# Patient Record
Sex: MALE | Race: WHITE | NOT HISPANIC OR LATINO | URBAN - METROPOLITAN AREA
[De-identification: names, ages, dates, MRNs, and addresses within clinical notes are randomized per-mention and may not be internally consistent; named-entity substitution may affect disease eponyms.]

---

## 2022-06-23 ENCOUNTER — LAB VISIT (OUTPATIENT)
Dept: LAB | Facility: OTHER | Age: 42
End: 2022-06-23
Attending: OTOLARYNGOLOGY
Payer: COMMERCIAL

## 2022-06-23 ENCOUNTER — OFFICE VISIT (OUTPATIENT)
Dept: OTOLARYNGOLOGY | Facility: CLINIC | Age: 42
End: 2022-06-23
Payer: COMMERCIAL

## 2022-06-23 VITALS — TEMPERATURE: 97 F | HEART RATE: 87 BPM | SYSTOLIC BLOOD PRESSURE: 147 MMHG | DIASTOLIC BLOOD PRESSURE: 88 MMHG

## 2022-06-23 DIAGNOSIS — K12.1 ORAL ULCERATION: ICD-10-CM

## 2022-06-23 DIAGNOSIS — K12.1 ORAL ULCERATION: Primary | ICD-10-CM

## 2022-06-23 LAB
ALBUMIN SERPL BCP-MCNC: 4 G/DL (ref 3.5–5.2)
ALP SERPL-CCNC: 93 U/L (ref 55–135)
ALT SERPL W/O P-5'-P-CCNC: 19 U/L (ref 10–44)
ANION GAP SERPL CALC-SCNC: 13 MMOL/L (ref 8–16)
AST SERPL-CCNC: 17 U/L (ref 10–40)
BASOPHILS # BLD AUTO: 0.02 K/UL (ref 0–0.2)
BASOPHILS NFR BLD: 0.3 % (ref 0–1.9)
BILIRUB SERPL-MCNC: 0.5 MG/DL (ref 0.1–1)
BUN SERPL-MCNC: 7 MG/DL (ref 6–20)
CALCIUM SERPL-MCNC: 9.8 MG/DL (ref 8.7–10.5)
CHLORIDE SERPL-SCNC: 108 MMOL/L (ref 95–110)
CO2 SERPL-SCNC: 18 MMOL/L (ref 23–29)
CREAT SERPL-MCNC: 1.1 MG/DL (ref 0.5–1.4)
CRP SERPL-MCNC: 5.9 MG/L (ref 0–8.2)
DIFFERENTIAL METHOD: ABNORMAL
EOSINOPHIL # BLD AUTO: 0.1 K/UL (ref 0–0.5)
EOSINOPHIL NFR BLD: 1.6 % (ref 0–8)
ERYTHROCYTE [DISTWIDTH] IN BLOOD BY AUTOMATED COUNT: 12.6 % (ref 11.5–14.5)
ERYTHROCYTE [SEDIMENTATION RATE] IN BLOOD: 42 MM/HR (ref 0–10)
EST. GFR  (AFRICAN AMERICAN): >60 ML/MIN/1.73 M^2
EST. GFR  (NON AFRICAN AMERICAN): >60 ML/MIN/1.73 M^2
GLUCOSE SERPL-MCNC: 111 MG/DL (ref 70–110)
HCT VFR BLD AUTO: 40 % (ref 40–54)
HGB BLD-MCNC: 13.2 G/DL (ref 14–18)
IMM GRANULOCYTES # BLD AUTO: 0.02 K/UL (ref 0–0.04)
IMM GRANULOCYTES NFR BLD AUTO: 0.3 % (ref 0–0.5)
LYMPHOCYTES # BLD AUTO: 1.2 K/UL (ref 1–4.8)
LYMPHOCYTES NFR BLD: 19.6 % (ref 18–48)
MCH RBC QN AUTO: 30.5 PG (ref 27–31)
MCHC RBC AUTO-ENTMCNC: 33 G/DL (ref 32–36)
MCV RBC AUTO: 92 FL (ref 82–98)
MONOCYTES # BLD AUTO: 0.4 K/UL (ref 0.3–1)
MONOCYTES NFR BLD: 6.6 % (ref 4–15)
NEUTROPHILS # BLD AUTO: 4.4 K/UL (ref 1.8–7.7)
NEUTROPHILS NFR BLD: 71.6 % (ref 38–73)
NRBC BLD-RTO: 0 /100 WBC
PLATELET # BLD AUTO: 308 K/UL (ref 150–450)
PMV BLD AUTO: 9.1 FL (ref 9.2–12.9)
POTASSIUM SERPL-SCNC: 3.7 MMOL/L (ref 3.5–5.1)
PROT SERPL-MCNC: 7.7 G/DL (ref 6–8.4)
RBC # BLD AUTO: 4.33 M/UL (ref 4.6–6.2)
SODIUM SERPL-SCNC: 139 MMOL/L (ref 136–145)
WBC # BLD AUTO: 6.17 K/UL (ref 3.9–12.7)

## 2022-06-23 PROCEDURE — 31575 DIAGNOSTIC LARYNGOSCOPY: CPT | Mod: S$GLB,,, | Performed by: OTOLARYNGOLOGY

## 2022-06-23 PROCEDURE — 85651 RBC SED RATE NONAUTOMATED: CPT | Performed by: OTOLARYNGOLOGY

## 2022-06-23 PROCEDURE — 3077F PR MOST RECENT SYSTOLIC BLOOD PRESSURE >= 140 MM HG: ICD-10-PCS | Mod: CPTII,S$GLB,, | Performed by: OTOLARYNGOLOGY

## 2022-06-23 PROCEDURE — 87102 FUNGUS ISOLATION CULTURE: CPT | Performed by: OTOLARYNGOLOGY

## 2022-06-23 PROCEDURE — 3079F PR MOST RECENT DIASTOLIC BLOOD PRESSURE 80-89 MM HG: ICD-10-PCS | Mod: CPTII,S$GLB,, | Performed by: OTOLARYNGOLOGY

## 2022-06-23 PROCEDURE — 86038 ANTINUCLEAR ANTIBODIES: CPT | Performed by: OTOLARYNGOLOGY

## 2022-06-23 PROCEDURE — 1159F MED LIST DOCD IN RCRD: CPT | Mod: CPTII,S$GLB,, | Performed by: OTOLARYNGOLOGY

## 2022-06-23 PROCEDURE — 86140 C-REACTIVE PROTEIN: CPT | Performed by: OTOLARYNGOLOGY

## 2022-06-23 PROCEDURE — 1160F PR REVIEW ALL MEDS BY PRESCRIBER/CLIN PHARMACIST DOCUMENTED: ICD-10-PCS | Mod: CPTII,S$GLB,, | Performed by: OTOLARYNGOLOGY

## 2022-06-23 PROCEDURE — 99204 PR OFFICE/OUTPT VISIT, NEW, LEVL IV, 45-59 MIN: ICD-10-PCS | Mod: 25,S$GLB,, | Performed by: OTOLARYNGOLOGY

## 2022-06-23 PROCEDURE — 36415 COLL VENOUS BLD VENIPUNCTURE: CPT | Performed by: OTOLARYNGOLOGY

## 2022-06-23 PROCEDURE — 80053 COMPREHEN METABOLIC PANEL: CPT | Performed by: OTOLARYNGOLOGY

## 2022-06-23 PROCEDURE — 3077F SYST BP >= 140 MM HG: CPT | Mod: CPTII,S$GLB,, | Performed by: OTOLARYNGOLOGY

## 2022-06-23 PROCEDURE — 3079F DIAST BP 80-89 MM HG: CPT | Mod: CPTII,S$GLB,, | Performed by: OTOLARYNGOLOGY

## 2022-06-23 PROCEDURE — 87075 CULTR BACTERIA EXCEPT BLOOD: CPT | Performed by: OTOLARYNGOLOGY

## 2022-06-23 PROCEDURE — 87070 CULTURE OTHR SPECIMN AEROBIC: CPT | Performed by: OTOLARYNGOLOGY

## 2022-06-23 PROCEDURE — 85025 COMPLETE CBC W/AUTO DIFF WBC: CPT | Performed by: OTOLARYNGOLOGY

## 2022-06-23 PROCEDURE — 1159F PR MEDICATION LIST DOCUMENTED IN MEDICAL RECORD: ICD-10-PCS | Mod: CPTII,S$GLB,, | Performed by: OTOLARYNGOLOGY

## 2022-06-23 PROCEDURE — 31575 PR LARYNGOSCOPY, FLEXIBLE; DIAGNOSTIC: ICD-10-PCS | Mod: S$GLB,,, | Performed by: OTOLARYNGOLOGY

## 2022-06-23 PROCEDURE — 99204 OFFICE O/P NEW MOD 45 MIN: CPT | Mod: 25,S$GLB,, | Performed by: OTOLARYNGOLOGY

## 2022-06-23 PROCEDURE — 87076 CULTURE ANAEROBE IDENT EACH: CPT | Performed by: OTOLARYNGOLOGY

## 2022-06-23 PROCEDURE — 87389 HIV-1 AG W/HIV-1&-2 AB AG IA: CPT | Performed by: OTOLARYNGOLOGY

## 2022-06-23 PROCEDURE — 1160F RVW MEDS BY RX/DR IN RCRD: CPT | Mod: CPTII,S$GLB,, | Performed by: OTOLARYNGOLOGY

## 2022-06-23 RX ORDER — VALACYCLOVIR HYDROCHLORIDE 1 G/1
1000 TABLET, FILM COATED ORAL 2 TIMES DAILY
Qty: 42 TABLET | Refills: 0 | Status: SHIPPED | OUTPATIENT
Start: 2022-06-23 | End: 2022-07-14

## 2022-06-23 RX ORDER — AZELASTINE HCL 205.5 UG/1
SPRAY NASAL
COMMUNITY
Start: 2022-05-27

## 2022-06-23 RX ORDER — TADALAFIL 5 MG/1
TABLET ORAL
COMMUNITY
Start: 2022-03-01

## 2022-06-23 RX ORDER — FLUTICASONE PROPIONATE AND SALMETEROL 100; 50 UG/1; UG/1
POWDER RESPIRATORY (INHALATION)
COMMUNITY
Start: 2022-05-27

## 2022-06-23 NOTE — PROGRESS NOTES
Subjective:     Chief Complaint:   Chief Complaint   Patient presents with    Other     Screening for throat cancer       Brandon Downing is a 42 y.o. male who was self-referred for oral ulcers.    Reports 4 months of recurrent mouth sores. Cultures performed by his primary and were negative. Treated with allergy medications without improvement.  Patient denies any reflux or heartburn symptoms.  Patient reports lifelong history of recurrent aphthous ulcers.  Reports in his adolescents these were frequent and bothersome.  Over the past few years his symptoms have not been as severe until the last 4 months.  Reports oral in or pharyngeal ulcers involving the mucosal lips ventral surface of the tongue and tonsillar region.  Worse on the left side.  He denies any inflammatory bowel symptoms.  No history of recurrent infections.  Not immunocompromised.     Patient has a history of facial trauma resulting in injury to his maxillary teeth.  Had upper plate placed in 2016.     No fam history of ulcerative collitis.   Maternal grandfather with h/o oropharyngeal SCC.     There is not a prior history of sinonasal surgery.  He is not an active smoker.  Reports rare vaping approximately 2 times per month but none recently.  No significant alcohol use.  Rare marijuana use but none recently.    Past Medical History  He has no past medical history on file. Laminectomy, appendectomy.     Past Surgical History  He has no past surgical history on file.    Family History  His family history is not on file.    Social History  He reports that he has never smoked. He has never used smokeless tobacco. reports vaping 1-2 x per months; ETOH minimal, rare marijuana use    Allergies  He has No Known Allergies.    Medications  He has a current medication list which includes the following prescription(s): azelastine, cetirizine, fluticasone-salmeterol 100-50 mcg/dose, tadalafil, diphenhydrAMINE-aluminum-magnesium  hydroxide-simethicone-LIDOcaine HCl 2%, and valacyclovir.    Review of Systems     Constitutional: Positive for fatigue and unexpected weight loss.      HENT: Positive for ear pain, hearing loss, mouth sores and sore throat.      Respiratory:  Positive for cough, sleep apnea and snoring.      Cardiovascular:  Negative for chest pain, foot swelling, irregular heartbeat and swollen veins.     Gastrointestinal:  Positive for abdominal pain and heartburn.     Musc: Positive for back pain.     Skin: Negative for rash.     Allergy: Negative for food allergies and seasonal allergies.     Endocrine: Negative for cold intolerance and heat intolerance.      Neurological: Positive for headaches and light-headedness.     Hematologic: Positive for swollen glands.     Psychiatric: Positive for decreased concentration, depression, nervous/anxious and sleep disturbance.              Objective:     BP (!) 147/88   Pulse 87   Temp 97 °F (36.1 °C) (Temporal)      Constitutional:   Vital signs are normal. He appears well-developed and well-nourished. Normal speech.      Head:  Normocephalic and atraumatic.     Ears:  Right ear hearing normal to normal and whispered voice; external ear normal without scars, lesions, or masses; ear canal, tympanic membrane, and middle ear normal. and left ear hearing normal to normal and whispered voice; external ear normal without scars, lesions, or masses; ear canal, tympanic membrane, and middle ear normal..     Nose:  No mucosal edema or rhinorrhea. Turbinates normal.  Right sinus exhibits no maxillary sinus tenderness and no frontal sinus tenderness. Left sinus exhibits no maxillary sinus tenderness and no frontal sinus tenderness.     Mouth/Throat          Neck:  Neck normal without thyromegaly masses, asymmetry, normal tracheal structure, crepitus, and tenderness, thyroid normal, trachea normal, phonation normal and no adenopathy.     Pulmonary/Chest:   Effort normal. No apnea, no tachypnea and  no bradypnea. No respiratory distress.     Psychiatric:   His speech is normal and behavior is normal. His mood appears anxious.       Procedure    Flexible laryngoscopy performed.  See procedure note.    Nasal/Nasopharyngo/Laryn/Hypopharyngoscopy Procedures    Procedure:  Diagnostic nasal, nasopharyngoscopy, laryngoscopy and hypopharyngoscopy.    Routine preparation with local 1% neosynephrine and lidocaine     NOSE:   External:  No gross deformity   Intranasal:    Mucosa:  No polyps, ulcers or lesions.    Septum:  No gross deformity.    Turbinates:  Not enlarged.    Nasopharynx:  No lesions.   Mucosa:  No lesions.   Adenoids:  Present.   Posterior Choanae:  Patent.   Eustachian Tubes:  Patent.    Oropharynx:    BOT: No lesions or edema      Larynx/hypopharynx:   Epiglottis:  No lesions, without edema.   Arytenoids: no lesions    AE Folds:  No lesions.   Vocal cords:  No masses, symmetric movement, good approximation    Subglottis: No obvious stenosis   Hypopharynx:  No lesions.   Piriform sinus:  No pooling or lesions.   Post Cricoid:  No edema or erythema             Assessment:     1. Oral ulceration          Plan:     I had a long discussion with the patient regarding his condition and the further workup and management options.  Aerobic, anaerobic, and fungal cultures obtained from then oropharyngeal ulcer on the left tonsil.  We will empirically treat with Valtrex and treat symptoms with Magic mouthwash.  Will check CBC, CMP ESR, CRP, HIV and MARKY.  He will follow-up in 1 week and will plan for in office biopsy if ulcerations are still present or have not improved.  I will contact him if culture results are positive in require antibiotic treatment.

## 2022-06-24 LAB
ANA SER QL IF: NORMAL
HIV 1+2 AB+HIV1 P24 AG SERPL QL IA: NEGATIVE

## 2022-06-25 LAB — BACTERIA SPEC AEROBE CULT: NORMAL

## 2022-06-27 LAB — BACTERIA SPEC ANAEROBE CULT: ABNORMAL

## 2022-06-30 ENCOUNTER — OFFICE VISIT (OUTPATIENT)
Dept: OTOLARYNGOLOGY | Facility: CLINIC | Age: 42
End: 2022-06-30
Payer: COMMERCIAL

## 2022-06-30 VITALS
TEMPERATURE: 98 F | HEART RATE: 92 BPM | SYSTOLIC BLOOD PRESSURE: 153 MMHG | DIASTOLIC BLOOD PRESSURE: 102 MMHG | HEIGHT: 74 IN

## 2022-06-30 DIAGNOSIS — K12.1 ORAL ULCERATION: Primary | ICD-10-CM

## 2022-06-30 PROCEDURE — 3077F PR MOST RECENT SYSTOLIC BLOOD PRESSURE >= 140 MM HG: ICD-10-PCS | Mod: CPTII,S$GLB,, | Performed by: OTOLARYNGOLOGY

## 2022-06-30 PROCEDURE — 40490 BIOPSY OF LIP: CPT | Mod: S$GLB,,, | Performed by: OTOLARYNGOLOGY

## 2022-06-30 PROCEDURE — 3080F PR MOST RECENT DIASTOLIC BLOOD PRESSURE >= 90 MM HG: ICD-10-PCS | Mod: CPTII,S$GLB,, | Performed by: OTOLARYNGOLOGY

## 2022-06-30 PROCEDURE — 3080F DIAST BP >= 90 MM HG: CPT | Mod: CPTII,S$GLB,, | Performed by: OTOLARYNGOLOGY

## 2022-06-30 PROCEDURE — 99213 OFFICE O/P EST LOW 20 MIN: CPT | Mod: 25,S$GLB,, | Performed by: OTOLARYNGOLOGY

## 2022-06-30 PROCEDURE — 40490 PR BIOPSY OF LIP: ICD-10-PCS | Mod: S$GLB,,, | Performed by: OTOLARYNGOLOGY

## 2022-06-30 PROCEDURE — 88305 TISSUE EXAM BY PATHOLOGIST: CPT | Performed by: PATHOLOGY

## 2022-06-30 PROCEDURE — 88305 TISSUE EXAM BY PATHOLOGIST: CPT | Mod: 26,,, | Performed by: PATHOLOGY

## 2022-06-30 PROCEDURE — 1159F PR MEDICATION LIST DOCUMENTED IN MEDICAL RECORD: ICD-10-PCS | Mod: CPTII,S$GLB,, | Performed by: OTOLARYNGOLOGY

## 2022-06-30 PROCEDURE — 3077F SYST BP >= 140 MM HG: CPT | Mod: CPTII,S$GLB,, | Performed by: OTOLARYNGOLOGY

## 2022-06-30 PROCEDURE — 1160F RVW MEDS BY RX/DR IN RCRD: CPT | Mod: CPTII,S$GLB,, | Performed by: OTOLARYNGOLOGY

## 2022-06-30 PROCEDURE — 1159F MED LIST DOCD IN RCRD: CPT | Mod: CPTII,S$GLB,, | Performed by: OTOLARYNGOLOGY

## 2022-06-30 PROCEDURE — 1160F PR REVIEW ALL MEDS BY PRESCRIBER/CLIN PHARMACIST DOCUMENTED: ICD-10-PCS | Mod: CPTII,S$GLB,, | Performed by: OTOLARYNGOLOGY

## 2022-06-30 PROCEDURE — 88305 TISSUE EXAM BY PATHOLOGIST: ICD-10-PCS | Mod: 26,,, | Performed by: PATHOLOGY

## 2022-06-30 PROCEDURE — 99213 PR OFFICE/OUTPT VISIT, EST, LEVL III, 20-29 MIN: ICD-10-PCS | Mod: 25,S$GLB,, | Performed by: OTOLARYNGOLOGY

## 2022-06-30 NOTE — PROGRESS NOTES
Subjective:     Chief Complaint:   Chief Complaint   Patient presents with    Follow-up     Oral ulcers getting better       Brandon Downing is a 42 y.o. male who was self-referred for oral ulcers.    Reports 4 months of recurrent mouth sores. Cultures performed by his primary and were negative. Treated with allergy medications without improvement.  Patient denies any reflux or heartburn symptoms.  Patient reports lifelong history of recurrent aphthous ulcers.  Reports in his adolescents these were frequent and bothersome.  Over the past few years his symptoms have not been as severe until the last 4 months.  Reports oral in or pharyngeal ulcers involving the mucosal lips ventral surface of the tongue and tonsillar region.  Worse on the left side.  He denies any inflammatory bowel symptoms.  No history of recurrent infections.  Not immunocompromised.     Patient has a history of facial trauma resulting in injury to his maxillary teeth.  Had upper plate placed in 2016.     No fam history of ulcerative collitis.   Maternal grandfather with h/o oropharyngeal SCC.     There is not a prior history of sinonasal surgery.  He is not an active smoker.  Reports rare vaping approximately 2 times per month but none recently.  No significant alcohol use.  Rare marijuana use but none recently.    Today (06/30/2022): Patient returns for follow up visit. Patient reports waxing and waning of ulcerations.  Feels the mouthwash is improving symptoms.  Stop taking the Valtrex as he did not notice a difference on this.  He has noticed joint aches and fatigue more recently.    Past Medical History  He has no past medical history on file. Laminectomy, appendectomy.     Past Surgical History  He has no past surgical history on file.    Family History  His family history is not on file.    Social History  He reports that he has never smoked. He has never used smokeless tobacco. reports vaping 1-2 x per months; ETOH minimal, rare  "marijuana use    Allergies  He has No Known Allergies.    Medications  He has a current medication list which includes the following prescription(s): azelastine, cetirizine, diphenhydrAMINE-aluminum-magnesium hydroxide-simethicone-LIDOcaine HCl 2%, fluticasone-salmeterol 100-50 mcg/dose, tadalafil, and valacyclovir.    Review of Systems     Constitutional: Positive for fatigue and unexpected weight loss.      HENT: Positive for ear pain, hearing loss, mouth sores, sore throat and trouble swallowing.      Eyes:  Positive for eye drainage, eye itching and photophobia.     Respiratory:  Positive for cough, sleep apnea and snoring.      Cardiovascular:  Negative for chest pain, foot swelling, irregular heartbeat and swollen veins.     Gastrointestinal:  Positive for abdominal pain, constipation and heartburn.     Genitourinary: Negative for difficulty urinating, sexual problems and frequent urination.     Musc: Positive for aching muscles, back pain and neck pain.     Skin: Negative for rash.     Allergy: Negative for food allergies and seasonal allergies.     Endocrine: Negative for cold intolerance and heat intolerance.      Neurological: Positive for headaches and light-headedness.     Hematologic: Positive for swollen glands.     Psychiatric: Positive for decreased concentration, depression, nervous/anxious and sleep disturbance.              Objective:     BP (!) 153/102   Pulse 92   Temp 97.9 °F (36.6 °C)   Ht 6' 2" (1.88 m)      Constitutional:   Vital signs are normal. He appears well-developed and well-nourished. Normal speech.      Head:  Normocephalic and atraumatic.     Ears:  Right ear hearing normal to normal and whispered voice; external ear normal without scars, lesions, or masses; ear canal, tympanic membrane, and middle ear normal. and left ear hearing normal to normal and whispered voice; external ear normal without scars, lesions, or masses; ear canal, tympanic membrane, and middle ear normal.. "     Nose:  No mucosal edema or rhinorrhea. Turbinates normal.  Right sinus exhibits no maxillary sinus tenderness and no frontal sinus tenderness. Left sinus exhibits no maxillary sinus tenderness and no frontal sinus tenderness.     Mouth/Throat          Neck:  Neck normal without thyromegaly masses, asymmetry, normal tracheal structure, crepitus, and tenderness, thyroid normal, trachea normal, phonation normal and no adenopathy.     Pulmonary/Chest:   Effort normal. No apnea, no tachypnea and no bradypnea. No respiratory distress.     Psychiatric:   His speech is normal and behavior is normal. His mood appears anxious.       Procedure     Punch biopsy right mucosal lip  Anesthesia: topical/local  Details:  Discussed the risks benefits and alternatives to punch biopsy.  Patient voiced understanding, written consent obtained.  4 mm punch biopsy obtained from the right mucosal lip ulceration.  Biopsy taken on the edge of the ulceration.  Hemostasis achieved with the hyfrecator.  Biopsy site closed with 5-0 chromic suture.  Patient tolerated the procedure well I complications.      Assessment:     1. Oral ulceration          Plan:     I had a long discussion with the patient regarding his condition and the further workup and management options.  Previous cultures revealed normal oral isreal.  Biopsy obtained today.  Patient tolerated the procedure well.  Concern for autoimmune related disorder.  Will place referral to Rheumatology for further evaluation.  I will contact the patient regarding the biopsy results.  All questions answered patient was encouraged call with any questions or concerns.

## 2022-07-08 LAB
COMMENT: NORMAL
FINAL PATHOLOGIC DIAGNOSIS: NORMAL
GROSS: NORMAL
Lab: NORMAL
MICROSCOPIC EXAM: NORMAL

## 2022-07-13 ENCOUNTER — OFFICE VISIT (OUTPATIENT)
Dept: RHEUMATOLOGY | Facility: CLINIC | Age: 42
End: 2022-07-13
Payer: COMMERCIAL

## 2022-07-13 ENCOUNTER — HOSPITAL ENCOUNTER (OUTPATIENT)
Dept: RADIOLOGY | Facility: HOSPITAL | Age: 42
Discharge: HOME | End: 2022-07-13
Attending: INTERNAL MEDICINE
Payer: COMMERCIAL

## 2022-07-13 VITALS
BODY MASS INDEX: 32.92 KG/M2 | SYSTOLIC BLOOD PRESSURE: 142 MMHG | DIASTOLIC BLOOD PRESSURE: 90 MMHG | HEART RATE: 60 BPM | WEIGHT: 256.38 LBS

## 2022-07-13 DIAGNOSIS — M47.816 LUMBAR SPONDYLOSIS: Primary | ICD-10-CM

## 2022-07-13 DIAGNOSIS — K12.1 ORAL ULCERATION: ICD-10-CM

## 2022-07-13 PROCEDURE — 1160F PR REVIEW ALL MEDS BY PRESCRIBER/CLIN PHARMACIST DOCUMENTED: ICD-10-PCS | Mod: CPTII,S$GLB,, | Performed by: INTERNAL MEDICINE

## 2022-07-13 PROCEDURE — 71046 X-RAY EXAM CHEST 2 VIEWS: CPT | Mod: TC,FY

## 2022-07-13 PROCEDURE — 71046 X-RAY EXAM CHEST 2 VIEWS: CPT | Mod: 26,,, | Performed by: RADIOLOGY

## 2022-07-13 PROCEDURE — 3008F BODY MASS INDEX DOCD: CPT | Mod: CPTII,S$GLB,, | Performed by: INTERNAL MEDICINE

## 2022-07-13 PROCEDURE — 1159F PR MEDICATION LIST DOCUMENTED IN MEDICAL RECORD: ICD-10-PCS | Mod: CPTII,S$GLB,, | Performed by: INTERNAL MEDICINE

## 2022-07-13 PROCEDURE — 99999 PR PBB SHADOW E&M-EST. PATIENT-LVL III: CPT | Mod: PBBFAC,,, | Performed by: INTERNAL MEDICINE

## 2022-07-13 PROCEDURE — 1159F MED LIST DOCD IN RCRD: CPT | Mod: CPTII,S$GLB,, | Performed by: INTERNAL MEDICINE

## 2022-07-13 PROCEDURE — 3008F PR BODY MASS INDEX (BMI) DOCUMENTED: ICD-10-PCS | Mod: CPTII,S$GLB,, | Performed by: INTERNAL MEDICINE

## 2022-07-13 PROCEDURE — 71046 XR CHEST PA AND LATERAL: ICD-10-PCS | Mod: 26,,, | Performed by: RADIOLOGY

## 2022-07-13 PROCEDURE — 1160F RVW MEDS BY RX/DR IN RCRD: CPT | Mod: CPTII,S$GLB,, | Performed by: INTERNAL MEDICINE

## 2022-07-13 PROCEDURE — 99204 PR OFFICE/OUTPT VISIT, NEW, LEVL IV, 45-59 MIN: ICD-10-PCS | Mod: S$GLB,,, | Performed by: INTERNAL MEDICINE

## 2022-07-13 PROCEDURE — 99204 OFFICE O/P NEW MOD 45 MIN: CPT | Mod: S$GLB,,, | Performed by: INTERNAL MEDICINE

## 2022-07-13 PROCEDURE — 99999 PR PBB SHADOW E&M-EST. PATIENT-LVL III: ICD-10-PCS | Mod: PBBFAC,,, | Performed by: INTERNAL MEDICINE

## 2022-07-15 ENCOUNTER — PATIENT MESSAGE (OUTPATIENT)
Dept: OTOLARYNGOLOGY | Facility: CLINIC | Age: 42
End: 2022-07-15
Payer: COMMERCIAL

## 2022-07-17 NOTE — PROGRESS NOTES
History of present illness:  The 42-year-old gentleman who says he has had mouth ulcers for most of his life.  They were occurring almost weekly when he was under 21. They became less frequent for a while but are now getting more frequent.  They can occur in any part of the mouth from the lip to the palate.  He had been under no medical care until recently.  He has seen his dentist but no diagnosis was made.  They are painful.  He denies any nasal ulcers.  He has had 1 episode of pain I will ulcer.  The ulcers may last for up to a week.  He may go several months without any ulcers.    He complains of low-grade fever.  He has occipital headaches.  He has occasional erythema of his skin.  He has had occasional red eye.  He saw an eye doctor was told with just irritation.  He denies dryness of his  eyes or mouth.  He has no Raynaud's phenomena, pleurisy, chronic or bloody diarrhea.  He has occasional pain with using his hands.  He has foot pain with walking.  He has had no joint swelling.  He has paresthesias of his lateral thigh.  He has no thrombophlebitis.  He is adopted and does not know his family history.    He has a history of chronic low back pain.  The pain is worse with activity.  He has had a prior lumbar laminectomy.    Systems review:  General:  He has lost 40 lb by watching his diet but also because of the mouth pain    Respiratory:  No chronic cough or sputum production.  No shortness of breath.  GI:  No abdominal pain or peptic ulcer disease.  No liver problems.  :  No kidney or bladder problems    Physical examination:  Skin:  No rashes  ENT:  He has an erythematous plaque on the roof of his mouth.  It is not raised.  He has no ulcerations at this time.  He has no conjunctivitis.  He has adequate tears in saliva.  Chest:  Clear to auscultation and percussion  Cardiac:  No murmurs, gallops, rubs  Abdomen:  No organomegaly or masses.  No tenderness to palpation  Extremities:  No pedal  edema  Musculoskeletal:  Full range of motion of all joints.  No synovitis.  No pain on range of motion.  Pathology:  Biopsy of 1 of his lesions shows a 2 per basilar vestibulobullous process with some ulceration.  There is some inflammation in the area of the ulceration.  Laboratory:  He had a negative MARKY and normal CRP.  His sed rate is elevated at 42.    Assessment:  Recurrent oral ulcers.  The fact it is been going on since childhood makes it unlikely we are dealing with vasculitis or autoimmune disease.  The biopsy is not that of Behcet's syndrome, although he has had 1 episode of urethral ulcers.    Plans:  1. Further laboratory studies obtained  2. I have also ordered a chest x-ray  3. I did not give him a regular return appointment.  This depends on the laboratory studies  4. I have no therapeutic recommendations  5. If workup is negative.  It may be worthwhile for him to see     Answers for HPI/ROS submitted by the patient on 7/13/2022  fever: No  eye redness: Yes  mouth sores: Yes  headaches: Yes  shortness of breath: No  chest pain: No  trouble swallowing: No  diarrhea: No  constipation: No  unexpected weight change: No  genital sore: No  During the last 3 days, have you had a skin rash?: Yes  Bruises or bleeds easily: No  cough: Yes

## 2022-07-18 DIAGNOSIS — K12.1 ORAL ULCERATION: Primary | ICD-10-CM

## 2022-07-18 RX ORDER — AZITHROMYCIN 250 MG/1
500 TABLET, FILM COATED ORAL DAILY
Qty: 20 TABLET | Refills: 0 | Status: SHIPPED | OUTPATIENT
Start: 2022-07-18 | End: 2022-07-28

## 2022-07-19 ENCOUNTER — TELEPHONE (OUTPATIENT)
Dept: RHEUMATOLOGY | Facility: CLINIC | Age: 42
End: 2022-07-19
Payer: COMMERCIAL

## 2022-07-19 DIAGNOSIS — K12.1 ORAL ULCERATION: Primary | ICD-10-CM

## 2022-07-22 ENCOUNTER — PATIENT MESSAGE (OUTPATIENT)
Dept: OTOLARYNGOLOGY | Facility: CLINIC | Age: 42
End: 2022-07-22
Payer: COMMERCIAL

## 2022-07-26 LAB — FUNGUS SPEC CULT: NORMAL

## 2022-08-01 ENCOUNTER — PATIENT MESSAGE (OUTPATIENT)
Dept: OTOLARYNGOLOGY | Facility: CLINIC | Age: 42
End: 2022-08-01
Payer: COMMERCIAL

## 2022-08-26 ENCOUNTER — TELEPHONE (OUTPATIENT)
Dept: OTOLARYNGOLOGY | Facility: CLINIC | Age: 42
End: 2022-08-26
Payer: COMMERCIAL

## 2022-08-26 NOTE — TELEPHONE ENCOUNTER
----- Message from Humaira Yañez sent at 8/26/2022  9:36 AM CDT -----  Regarding: Lip biopsy denial  Contact: preservice  The patient has an out of state BCBS  plan, any services rendered out of state will not be covered with or without an authorization on file because there are no out of network benefits. Please contact the patient with treatment options. I spoke with Estee PALOMO  with [facility] at  [phone]

## 2022-08-26 NOTE — TELEPHONE ENCOUNTER
Patient's procedure was denied.  Patient has BCBS out of state ([place]).  He has not out of state benefits.  Dr. Munguia notified the patient and cancelled his future appointments.

## 2022-11-16 ENCOUNTER — OFFICE VISIT (OUTPATIENT)
Dept: DERMATOLOGY | Facility: CLINIC | Age: 42
End: 2022-11-16
Payer: COMMERCIAL

## 2022-11-16 DIAGNOSIS — L21.9 SEBORRHEIC DERMATITIS: Primary | ICD-10-CM

## 2022-11-16 PROCEDURE — 1159F PR MEDICATION LIST DOCUMENTED IN MEDICAL RECORD: ICD-10-PCS | Mod: CPTII,95,, | Performed by: STUDENT IN AN ORGANIZED HEALTH CARE EDUCATION/TRAINING PROGRAM

## 2022-11-16 PROCEDURE — 1160F PR REVIEW ALL MEDS BY PRESCRIBER/CLIN PHARMACIST DOCUMENTED: ICD-10-PCS | Mod: CPTII,95,, | Performed by: STUDENT IN AN ORGANIZED HEALTH CARE EDUCATION/TRAINING PROGRAM

## 2022-11-16 PROCEDURE — 99204 PR OFFICE/OUTPT VISIT, NEW, LEVL IV, 45-59 MIN: ICD-10-PCS | Mod: 95,,, | Performed by: STUDENT IN AN ORGANIZED HEALTH CARE EDUCATION/TRAINING PROGRAM

## 2022-11-16 PROCEDURE — 99204 OFFICE O/P NEW MOD 45 MIN: CPT | Mod: 95,,, | Performed by: STUDENT IN AN ORGANIZED HEALTH CARE EDUCATION/TRAINING PROGRAM

## 2022-11-16 PROCEDURE — 1159F MED LIST DOCD IN RCRD: CPT | Mod: CPTII,95,, | Performed by: STUDENT IN AN ORGANIZED HEALTH CARE EDUCATION/TRAINING PROGRAM

## 2022-11-16 PROCEDURE — 1160F RVW MEDS BY RX/DR IN RCRD: CPT | Mod: CPTII,95,, | Performed by: STUDENT IN AN ORGANIZED HEALTH CARE EDUCATION/TRAINING PROGRAM

## 2022-11-16 RX ORDER — FLUOCINONIDE TOPICAL SOLUTION USP, 0.05% 0.5 MG/ML
SOLUTION TOPICAL
Qty: 60 ML | Refills: 3 | Status: SHIPPED | OUTPATIENT
Start: 2022-11-16

## 2022-11-16 RX ORDER — KETOCONAZOLE 20 MG/ML
SHAMPOO, SUSPENSION TOPICAL WEEKLY
Qty: 120 ML | Refills: 5 | Status: SHIPPED | OUTPATIENT
Start: 2022-11-16

## 2022-11-16 RX ORDER — KETOCONAZOLE 20 MG/G
CREAM TOPICAL 2 TIMES DAILY
Qty: 30 G | Refills: 2 | Status: SHIPPED | OUTPATIENT
Start: 2022-11-16

## 2022-11-16 RX ORDER — HYDROCORTISONE 25 MG/G
CREAM TOPICAL 2 TIMES DAILY
Qty: 30 G | Refills: 2 | Status: SHIPPED | OUTPATIENT
Start: 2022-11-16

## 2022-11-16 RX ORDER — FLUCONAZOLE 200 MG/1
TABLET ORAL
Qty: 4 TABLET | Refills: 0 | Status: SHIPPED | OUTPATIENT
Start: 2022-11-16

## 2022-11-16 NOTE — PROGRESS NOTES
Patient Information  Name: Brandon Downing  : 1980  MRN: [id number]     Referring Physician:  Dr. Fan ref. provider found   Primary Care Physician:   Primary Doctor Mita   Date of Visit: 2022      Subjective:       Brandon Downing is a 42 y.o. male who presents for skin rash    HPI  The patient location is: [place], [place]  The chief complaint leading to consultation is: skin rash    Visit type: audiovisual    Face to Face time with patient: 8 min  10 minutes of total time spent on the encounter, which includes face to face time and non-face to face time preparing to see the patient (eg, review of tests), Obtaining and/or reviewing separately obtained history, Documenting clinical information in the electronic or other health record, Independently interpreting results (not separately reported) and communicating results to the patient/family/caregiver, or Care coordination (not separately reported).       Each patient to whom he or she provides medical services by telemedicine is:  (1) informed of the relationship between the physician and patient and the respective role of any other health care provider with respect to management of the patient; and (2) notified that he or she may decline to receive medical services by telemedicine and may withdraw from such care at any time.    Notes:   Patient with new complaint of lesion(s)  Location: face, scalp  Duration: years  Symptoms: scaling, flaking  Relieving factors/Previous treatments: OTC products, triamcinolone cream    Patient was last seen:Visit date not found     Prior notes by myself reviewed.   Clinical documentation obtained by nursing staff reviewed.    Review of Systems   Skin:  Positive for itching and rash.      Objective:    Physical Exam   Constitutional: He appears well-developed and well-nourished. No distress.   Neurological: He is alert and oriented to person, place, and time. He is not disoriented.   Psychiatric: He has a normal  mood and affect.   Skin:   Areas Examined (abnormalities noted in diagram):   Scalp / Hair Palpated and Inspected  Head / Face Inspection Performed            Diagram Legend     Erythematous scaling macule/papule c/w actinic keratosis       Vascular papule c/w angioma      Pigmented verrucoid papule/plaque c/w seborrheic keratosis      Yellow umbilicated papule c/w sebaceous hyperplasia      Irregularly shaped tan macule c/w lentigo     1-2 mm smooth white papules consistent with Milia      Movable subcutaneous cyst with punctum c/w epidermal inclusion cyst      Subcutaneous movable cyst c/w pilar cyst      Firm pink to brown papule c/w dermatofibroma      Pedunculated fleshy papule(s) c/w skin tag(s)      Evenly pigmented macule c/w junctional nevus     Mildly variegated pigmented, slightly irregular-bordered macule c/w mildly atypical nevus      Flesh colored to evenly pigmented papule c/w intradermal nevus       Pink pearly papule/plaque c/w basal cell carcinoma      Erythematous hyperkeratotic cursted plaque c/w SCC      Surgical scar with no sign of skin cancer recurrence      Open and closed comedones      Inflammatory papules and pustules      Verrucoid papule consistent consistent with wart     Erythematous eczematous patches and plaques     Dystrophic onycholytic nail with subungual debris c/w onychomycosis     Umbilicated papule    Erythematous-base heme-crusted tan verrucoid plaque consistent with inflamed seborrheic keratosis     Erythematous Silvery Scaling Plaque c/w Psoriasis     See annotation              [] Data reviewed  [] Independent review of test  [] Management discussed with another provider    Assessment / Plan:        Seborrheic dermatitis  -     hydrocortisone 2.5 % cream; Apply topically 2 (two) times daily. Mix with ketoconazole cream and AAA on face BID for up to 2 weeks at a time  Dispense: 30 g; Refill: 2  -     ketoconazole (NIZORAL) 2 % cream; Apply topically 2 (two) times daily.  Mix with hydrocortisone cream and AAA on face BID for up to 2 weeks at a time  Dispense: 30 g; Refill: 2  -     ketoconazole (NIZORAL) 2 % shampoo; Apply topically once a week. Lather in for 5-10 min before rinsing  Dispense: 120 mL; Refill: 5  -     fluocinonide (LIDEX) 0.05 % external solution; AAA scalp qday - bid prn pruritus  Dispense: 60 mL; Refill: 3  -     fluconazole (DIFLUCAN) 200 MG Tab; 1 po weekly x 2 weeks  Dispense: 4 tablet; Refill: 0  Counseling on topical steroids:  Patient counseled that the prolonged use of topical steroids can result in the increased appearance superficial blood vessels (telangiectasias) lightening (hypopigmentation), and   thinning of the skin ( atrophy).  Patient understands to avoid using high potency steroids in skin folds, the groin or the face.  The patient verbalized understanding of proper use and possible adverse effects of topical steroids.  All patient's questions and concerns were addressed.           LOS NUMBER AND COMPLEXITY OF PROBLEMS    COMPLEXITY OF DATA RISK TOTAL TIME (m)   73188  [id number] [] 1 self-limited or minor problem [x] Minimal to none [] No treatment recommended or patient to monitor 15-29  10-19   37381  [id number] Low  [] 2 or > self limited or minor problems  [] 1 stable chronic illness  [] 1 acute, uncomplicated illness or injury Limited (2)  [] Prior external notes from each unique source  [] Review result of each unique test  [] Order each unique test []  Low  OTC medications, minor skin biopsy 30-44 20-29   40183  [id number] Moderate  [x]  1 or > chronic illness with progression, exacerbation or SE of treatment  []  2 or more stable chronic illnesses  []  1 acute illness with systemic symptoms  []  1 acute complicated injury  []  1 undiagnosed new problem with uncertain prognosis Moderate (1/3 below)  []  3 or more data items        *Now includes assessment requiring independent historian  []  Independent interpretation of a test  []  Discuss  management/test with another provider Moderate  [x]  Prescription drug mgmt  []  Minor surgery with risk discussed  []  Mgmt limited by social determinates 45-59  30-39   41192  22890 High  []  1 or more chronic illness with severe exacerbation, progression or SE of treatment  []  1 acute or chronic illness/injury that poses a threat to life or bodily function Extensive (2/3 below)  []  3 or more data items        *Now includes assessment requiring independent historian.  []  Independent interpretation of a test  []  Discuss management/test with another provider High  []  Major surgery with risk discussed  []  Drug therapy requiring intensive monitoring for toxicity  []  Hospitalization  []  Decision for DNR 60-74  40-54      No follow-ups on file.    Kasie Patel MD, FAAD  [facility]